# Patient Record
Sex: MALE | Race: ASIAN | NOT HISPANIC OR LATINO
[De-identification: names, ages, dates, MRNs, and addresses within clinical notes are randomized per-mention and may not be internally consistent; named-entity substitution may affect disease eponyms.]

---

## 2017-06-15 ENCOUNTER — APPOINTMENT (OUTPATIENT)
Dept: OPHTHALMOLOGY | Facility: CLINIC | Age: 62
End: 2017-06-15

## 2017-06-15 PROBLEM — Z00.00 ENCOUNTER FOR PREVENTIVE HEALTH EXAMINATION: Status: ACTIVE | Noted: 2017-06-15

## 2019-06-03 ENCOUNTER — OUTPATIENT (OUTPATIENT)
Dept: OUTPATIENT SERVICES | Facility: HOSPITAL | Age: 64
LOS: 1 days | End: 2019-06-03
Payer: MEDICARE

## 2019-06-03 VITALS
OXYGEN SATURATION: 99 % | HEART RATE: 86 BPM | HEIGHT: 68 IN | RESPIRATION RATE: 16 BRPM | WEIGHT: 151.02 LBS | SYSTOLIC BLOOD PRESSURE: 117 MMHG | TEMPERATURE: 98 F | DIASTOLIC BLOOD PRESSURE: 83 MMHG

## 2019-06-03 DIAGNOSIS — Z01.818 ENCOUNTER FOR OTHER PREPROCEDURAL EXAMINATION: ICD-10-CM

## 2019-06-03 DIAGNOSIS — Z98.890 OTHER SPECIFIED POSTPROCEDURAL STATES: Chronic | ICD-10-CM

## 2019-06-03 DIAGNOSIS — I48.91 UNSPECIFIED ATRIAL FIBRILLATION: ICD-10-CM

## 2019-06-03 LAB
ALBUMIN SERPL ELPH-MCNC: 4.2 G/DL — SIGNIFICANT CHANGE UP (ref 3.3–5)
ALP SERPL-CCNC: 57 U/L — SIGNIFICANT CHANGE UP (ref 40–120)
ALT FLD-CCNC: 60 U/L — HIGH (ref 10–45)
ANION GAP SERPL CALC-SCNC: 11 MMOL/L — SIGNIFICANT CHANGE UP (ref 5–17)
APTT BLD: 32.4 SEC — SIGNIFICANT CHANGE UP (ref 27.5–36.3)
AST SERPL-CCNC: 25 U/L — SIGNIFICANT CHANGE UP (ref 10–40)
BILIRUB SERPL-MCNC: 0.4 MG/DL — SIGNIFICANT CHANGE UP (ref 0.2–1.2)
BLD GP AB SCN SERPL QL: NEGATIVE — SIGNIFICANT CHANGE UP
BUN SERPL-MCNC: 19 MG/DL — SIGNIFICANT CHANGE UP (ref 7–23)
CALCIUM SERPL-MCNC: 9.5 MG/DL — SIGNIFICANT CHANGE UP (ref 8.4–10.5)
CHLORIDE SERPL-SCNC: 107 MMOL/L — SIGNIFICANT CHANGE UP (ref 96–108)
CO2 SERPL-SCNC: 26 MMOL/L — SIGNIFICANT CHANGE UP (ref 22–31)
CREAT SERPL-MCNC: 1.08 MG/DL — SIGNIFICANT CHANGE UP (ref 0.5–1.3)
GLUCOSE SERPL-MCNC: 95 MG/DL — SIGNIFICANT CHANGE UP (ref 70–99)
HCT VFR BLD CALC: 41.7 % — SIGNIFICANT CHANGE UP (ref 39–50)
HGB BLD-MCNC: 14.4 G/DL — SIGNIFICANT CHANGE UP (ref 13–17)
INR BLD: 1.1 RATIO — SIGNIFICANT CHANGE UP (ref 0.88–1.16)
MCHC RBC-ENTMCNC: 32.5 PG — SIGNIFICANT CHANGE UP (ref 27–34)
MCHC RBC-ENTMCNC: 34.5 GM/DL — SIGNIFICANT CHANGE UP (ref 32–36)
MCV RBC AUTO: 94.1 FL — SIGNIFICANT CHANGE UP (ref 80–100)
PLATELET # BLD AUTO: 126 K/UL — LOW (ref 150–400)
POTASSIUM SERPL-MCNC: 4.8 MMOL/L — SIGNIFICANT CHANGE UP (ref 3.5–5.3)
POTASSIUM SERPL-SCNC: 4.8 MMOL/L — SIGNIFICANT CHANGE UP (ref 3.5–5.3)
PROT SERPL-MCNC: 6.9 G/DL — SIGNIFICANT CHANGE UP (ref 6–8.3)
PROTHROM AB SERPL-ACNC: 12.6 SEC — SIGNIFICANT CHANGE UP (ref 10–12.9)
RBC # BLD: 4.44 M/UL — SIGNIFICANT CHANGE UP (ref 4.2–5.8)
RBC # FLD: 11.9 % — SIGNIFICANT CHANGE UP (ref 10.3–14.5)
RH IG SCN BLD-IMP: POSITIVE — SIGNIFICANT CHANGE UP
SODIUM SERPL-SCNC: 144 MMOL/L — SIGNIFICANT CHANGE UP (ref 135–145)
WBC # BLD: 3.7 K/UL — LOW (ref 3.8–10.5)
WBC # FLD AUTO: 3.7 K/UL — LOW (ref 3.8–10.5)

## 2019-06-03 PROCEDURE — 93010 ELECTROCARDIOGRAM REPORT: CPT

## 2019-06-03 RX ORDER — METOPROLOL TARTRATE 50 MG
1 TABLET ORAL
Qty: 0 | Refills: 0 | DISCHARGE

## 2019-06-03 RX ORDER — APIXABAN 2.5 MG/1
1 TABLET, FILM COATED ORAL
Qty: 0 | Refills: 0 | DISCHARGE

## 2019-06-03 NOTE — H&P CARDIOLOGY - FAMILY HISTORY
Sibling  Still living? Unknown  Family history of aortic dissection, Age at diagnosis: Age Unknown     Father  Still living? Unknown  Family history of stroke, Age at diagnosis: Age Unknown

## 2019-06-03 NOTE — H&P CARDIOLOGY - HISTORY OF PRESENT ILLNESS
65 y/o  male PMH Afib on Eliquis presents for PST today for Afib ablation on 6/4/19. Pt reports he was instructed not to take his Eliquis tomorrow morning. 65 y/o  male PMH Afib on Eliquis presents for PST today for Afib ablation on 6/4/19. Pt reports he was instructed not to take his Eliquis tomorrow morning by DR. Howard. Reports feeling well.

## 2019-06-04 ENCOUNTER — INPATIENT (INPATIENT)
Facility: HOSPITAL | Age: 64
LOS: 0 days | Discharge: HOME | DRG: 274 | End: 2019-06-05
Attending: STUDENT IN AN ORGANIZED HEALTH CARE EDUCATION/TRAINING PROGRAM | Admitting: INTERNAL MEDICINE
Payer: COMMERCIAL

## 2019-06-04 VITALS
HEIGHT: 68 IN | RESPIRATION RATE: 18 BRPM | SYSTOLIC BLOOD PRESSURE: 106 MMHG | OXYGEN SATURATION: 100 % | HEART RATE: 60 BPM | DIASTOLIC BLOOD PRESSURE: 78 MMHG | WEIGHT: 153.66 LBS

## 2019-06-04 DIAGNOSIS — Z98.890 OTHER SPECIFIED POSTPROCEDURAL STATES: Chronic | ICD-10-CM

## 2019-06-04 DIAGNOSIS — I48.91 UNSPECIFIED ATRIAL FIBRILLATION: ICD-10-CM

## 2019-06-04 LAB
ALBUMIN SERPL ELPH-MCNC: 4.1 G/DL — SIGNIFICANT CHANGE UP (ref 3.3–5)
ALBUMIN SERPL ELPH-MCNC: 4.2 G/DL — SIGNIFICANT CHANGE UP (ref 3.3–5)
ALP SERPL-CCNC: 48 U/L — SIGNIFICANT CHANGE UP (ref 40–120)
ALP SERPL-CCNC: 53 U/L — SIGNIFICANT CHANGE UP (ref 40–120)
ALT FLD-CCNC: 51 U/L — HIGH (ref 10–45)
ALT FLD-CCNC: 53 U/L — HIGH (ref 10–45)
ANION GAP SERPL CALC-SCNC: 13 MMOL/L — SIGNIFICANT CHANGE UP (ref 5–17)
ANION GAP SERPL CALC-SCNC: 9 MMOL/L — SIGNIFICANT CHANGE UP (ref 5–17)
APTT BLD: 102.5 SEC — HIGH (ref 27.5–36.3)
AST SERPL-CCNC: 62 U/L — HIGH (ref 10–40)
AST SERPL-CCNC: 65 U/L — HIGH (ref 10–40)
BASOPHILS # BLD AUTO: 0 K/UL — SIGNIFICANT CHANGE UP (ref 0–0.2)
BASOPHILS NFR BLD AUTO: 0.2 % — SIGNIFICANT CHANGE UP (ref 0–2)
BILIRUB SERPL-MCNC: 0.8 MG/DL — SIGNIFICANT CHANGE UP (ref 0.2–1.2)
BILIRUB SERPL-MCNC: 0.8 MG/DL — SIGNIFICANT CHANGE UP (ref 0.2–1.2)
BLD GP AB SCN SERPL QL: NEGATIVE — SIGNIFICANT CHANGE UP
BUN SERPL-MCNC: 17 MG/DL — SIGNIFICANT CHANGE UP (ref 7–23)
BUN SERPL-MCNC: 18 MG/DL — SIGNIFICANT CHANGE UP (ref 7–23)
CALCIUM SERPL-MCNC: 8.5 MG/DL — SIGNIFICANT CHANGE UP (ref 8.4–10.5)
CALCIUM SERPL-MCNC: 8.6 MG/DL — SIGNIFICANT CHANGE UP (ref 8.4–10.5)
CHLORIDE SERPL-SCNC: 103 MMOL/L — SIGNIFICANT CHANGE UP (ref 96–108)
CHLORIDE SERPL-SCNC: 103 MMOL/L — SIGNIFICANT CHANGE UP (ref 96–108)
CO2 SERPL-SCNC: 22 MMOL/L — SIGNIFICANT CHANGE UP (ref 22–31)
CO2 SERPL-SCNC: 25 MMOL/L — SIGNIFICANT CHANGE UP (ref 22–31)
CREAT SERPL-MCNC: 0.93 MG/DL — SIGNIFICANT CHANGE UP (ref 0.5–1.3)
CREAT SERPL-MCNC: 1.05 MG/DL — SIGNIFICANT CHANGE UP (ref 0.5–1.3)
EOSINOPHIL # BLD AUTO: 0 K/UL — SIGNIFICANT CHANGE UP (ref 0–0.5)
EOSINOPHIL NFR BLD AUTO: 0.6 % — SIGNIFICANT CHANGE UP (ref 0–6)
GLUCOSE SERPL-MCNC: 108 MG/DL — HIGH (ref 70–99)
GLUCOSE SERPL-MCNC: 120 MG/DL — HIGH (ref 70–99)
HCT VFR BLD CALC: 46.4 % — SIGNIFICANT CHANGE UP (ref 39–50)
HGB BLD-MCNC: 15.7 G/DL — SIGNIFICANT CHANGE UP (ref 13–17)
INR BLD: 1.03 RATIO — SIGNIFICANT CHANGE UP (ref 0.88–1.16)
LYMPHOCYTES # BLD AUTO: 0.7 K/UL — LOW (ref 1–3.3)
LYMPHOCYTES # BLD AUTO: 9.9 % — LOW (ref 13–44)
MAGNESIUM SERPL-MCNC: 2 MG/DL — SIGNIFICANT CHANGE UP (ref 1.6–2.6)
MAGNESIUM SERPL-MCNC: 2.1 MG/DL — SIGNIFICANT CHANGE UP (ref 1.6–2.6)
MCHC RBC-ENTMCNC: 31.9 PG — SIGNIFICANT CHANGE UP (ref 27–34)
MCHC RBC-ENTMCNC: 33.8 GM/DL — SIGNIFICANT CHANGE UP (ref 32–36)
MCV RBC AUTO: 94.4 FL — SIGNIFICANT CHANGE UP (ref 80–100)
MONOCYTES # BLD AUTO: 0.4 K/UL — SIGNIFICANT CHANGE UP (ref 0–0.9)
MONOCYTES NFR BLD AUTO: 5.8 % — SIGNIFICANT CHANGE UP (ref 2–14)
NEUTROPHILS # BLD AUTO: 5.9 K/UL — SIGNIFICANT CHANGE UP (ref 1.8–7.4)
NEUTROPHILS NFR BLD AUTO: 83.4 % — HIGH (ref 43–77)
PHOSPHATE SERPL-MCNC: 3 MG/DL — SIGNIFICANT CHANGE UP (ref 2.5–4.5)
PHOSPHATE SERPL-MCNC: 3.4 MG/DL — SIGNIFICANT CHANGE UP (ref 2.5–4.5)
PLATELET # BLD AUTO: 117 K/UL — LOW (ref 150–400)
POTASSIUM SERPL-MCNC: 4.9 MMOL/L — SIGNIFICANT CHANGE UP (ref 3.5–5.3)
POTASSIUM SERPL-MCNC: 6 MMOL/L — HIGH (ref 3.5–5.3)
POTASSIUM SERPL-SCNC: 4.9 MMOL/L — SIGNIFICANT CHANGE UP (ref 3.5–5.3)
POTASSIUM SERPL-SCNC: 6 MMOL/L — HIGH (ref 3.5–5.3)
PROT SERPL-MCNC: 6.6 G/DL — SIGNIFICANT CHANGE UP (ref 6–8.3)
PROT SERPL-MCNC: 7.1 G/DL — SIGNIFICANT CHANGE UP (ref 6–8.3)
PROTHROM AB SERPL-ACNC: 11.8 SEC — SIGNIFICANT CHANGE UP (ref 10–12.9)
RBC # BLD: 4.91 M/UL — SIGNIFICANT CHANGE UP (ref 4.2–5.8)
RBC # FLD: 11.8 % — SIGNIFICANT CHANGE UP (ref 10.3–14.5)
RH IG SCN BLD-IMP: POSITIVE — SIGNIFICANT CHANGE UP
SODIUM SERPL-SCNC: 137 MMOL/L — SIGNIFICANT CHANGE UP (ref 135–145)
SODIUM SERPL-SCNC: 138 MMOL/L — SIGNIFICANT CHANGE UP (ref 135–145)
WBC # BLD: 7.1 K/UL — SIGNIFICANT CHANGE UP (ref 3.8–10.5)
WBC # FLD AUTO: 7.1 K/UL — SIGNIFICANT CHANGE UP (ref 3.8–10.5)

## 2019-06-04 PROCEDURE — 99223 1ST HOSP IP/OBS HIGH 75: CPT | Mod: GC

## 2019-06-04 PROCEDURE — 93010 ELECTROCARDIOGRAM REPORT: CPT

## 2019-06-04 RX ORDER — APIXABAN 2.5 MG/1
5 TABLET, FILM COATED ORAL EVERY 12 HOURS
Refills: 0 | Status: DISCONTINUED | OUTPATIENT
Start: 2019-06-04 | End: 2019-06-05

## 2019-06-04 RX ORDER — CHLORHEXIDINE GLUCONATE 213 G/1000ML
1 SOLUTION TOPICAL AT BEDTIME
Refills: 0 | Status: DISCONTINUED | OUTPATIENT
Start: 2019-06-04 | End: 2019-06-05

## 2019-06-04 RX ORDER — PANTOPRAZOLE SODIUM 20 MG/1
40 TABLET, DELAYED RELEASE ORAL
Refills: 0 | Status: DISCONTINUED | OUTPATIENT
Start: 2019-06-04 | End: 2019-06-05

## 2019-06-04 RX ORDER — CHLORHEXIDINE GLUCONATE 213 G/1000ML
1 SOLUTION TOPICAL
Refills: 0 | Status: DISCONTINUED | OUTPATIENT
Start: 2019-06-04 | End: 2019-06-04

## 2019-06-04 RX ORDER — APIXABAN 2.5 MG/1
2.5 TABLET, FILM COATED ORAL EVERY 12 HOURS
Refills: 0 | Status: DISCONTINUED | OUTPATIENT
Start: 2019-06-04 | End: 2019-06-04

## 2019-06-04 RX ORDER — METOPROLOL TARTRATE 50 MG
50 TABLET ORAL DAILY
Refills: 0 | Status: DISCONTINUED | OUTPATIENT
Start: 2019-06-04 | End: 2019-06-05

## 2019-06-04 RX ADMIN — Medication 50 MILLIGRAM(S): at 18:21

## 2019-06-04 RX ADMIN — APIXABAN 5 MILLIGRAM(S): 2.5 TABLET, FILM COATED ORAL at 18:21

## 2019-06-04 RX ADMIN — CHLORHEXIDINE GLUCONATE 1 APPLICATION(S): 213 SOLUTION TOPICAL at 21:08

## 2019-06-04 NOTE — PROGRESS NOTE ADULT - SUBJECTIVE AND OBJECTIVE BOX
====================  CCU MIDNIGHT ROUNDS  ====================    CLARISSE BROWN  [id number]  Patient is a 64y old  Male who presents with a chief complaint of afib ablation    ====================  SUMMARY: 63 y/o  M with PMH Afib on Eliquis presented today for scheduled Afib ablation with Dr. Howard.    ====================      ====================  NEW EVENTS: No acute issues overnight  ====================    MEDICATIONS  (STANDING):  apixaban 5 milliGRAM(s) Oral every 12 hours  chlorhexidine 2% Cloths 1 Application(s) Topical at bedtime  metoprolol succinate ER 50 milliGRAM(s) Oral daily  pantoprazole    Tablet 40 milliGRAM(s) Oral before breakfast    MEDICATIONS  (PRN):      ====================  VITALS (Last 12 hrs):  ====================    T(C): 36.6 (06-04-19 @ 20:00), Max: 36.6 (06-04-19 @ 20:00)  T(F): 97.9 (06-04-19 @ 20:00), Max: 97.9 (06-04-19 @ 20:00)  HR: 79 (06-04-19 @ 22:00) (58 - 86)  BP: 109/67 (06-04-19 @ 22:00) (98/70 - 120/78)  BP(mean): 84 (06-04-19 @ 22:00) (75 - 97)  RR: 13 (06-04-19 @ 22:00) (11 - 18)  SpO2: 97% (06-04-19 @ 22:00) (95% - 100%)      I&O's Summary    04 Jun 2019 07:01  -  04 Jun 2019 22:26  --------------------------------------------------------  IN: 120 mL / OUT: 150 mL / NET: -30 mL    ====================  NEW LABS:  ====================      06-04    138  |  103  |  17  ----------------------------<  120<H>  4.9   |  22  |  0.93    Ca    8.5      04 Jun 2019 16:22  Phos  3.4     06-04  Mg     2.0     06-04    TPro  6.6  /  Alb  4.1  /  TBili  0.8  /  DBili  x   /  AST  65<H>  /  ALT  51<H>  /  AlkPhos  48  06-04    PT/INR - ( 04 Jun 2019 14:43 )   PT: 11.8 sec;   INR: 1.03 ratio         PTT - ( 04 Jun 2019 14:43 )  PTT:102.5 sec      ====================  PLAN:  ====================  #Cardiac  Afib s/p ablation  -started Eliquis 5mg q12hrs tonight   -monitor groin site  -started mech soft diet and PPI  -restarted metoprolol ER 50mg daily  -encourage OOB tonight  -Planned for D/C in the AM  -EKG in the AM      Kristine Sandoval [facility] NP  Beeper #[id number]  Spectra # [phone]

## 2019-06-04 NOTE — CHART NOTE - NSCHARTNOTEFT_GEN_A_CORE
Chief Complaint:    Subjective:     Objective:  Vital Signs Last 24 Hrs  T(C): 36.4 (04 Jun 2019 14:00), Max: 36.4 (04 Jun 2019 14:00)  T(F): 97.5 (04 Jun 2019 14:00), Max: 97.5 (04 Jun 2019 14:00)  HR: 62 (04 Jun 2019 14:00) (58 - 62)  BP: 110/81 (04 Jun 2019 14:00) (106/78 - 118/84)  BP(mean): 91 (04 Jun 2019 14:00) (89 - 97)  RR: 11 (04 Jun 2019 14:00) (11 - 18)  SpO2: 100% (04 Jun 2019 14:00) (100% - 100%)  ICU Vital Signs Last 24 Hrs  T(C): 36.4 (04 Jun 2019 14:00), Max: 36.4 (04 Jun 2019 14:00)  T(F): 97.5 (04 Jun 2019 14:00), Max: 97.5 (04 Jun 2019 14:00)  HR: 62 (04 Jun 2019 14:00) (58 - 62)  BP: 110/81 (04 Jun 2019 14:00) (106/78 - 118/84)  BP(mean): 91 (04 Jun 2019 14:00) (89 - 97)  ABP: --  ABP(mean): --  RR: 11 (04 Jun 2019 14:00) (11 - 18)  SpO2: 100% (04 Jun 2019 14:00) (100% - 100%)    I&O's Summary    Daily     Daily     PHYSICAL EXAM:   General:   HEENT:  Cardiovascular:   Respiratory:   Gastrointestinal:  Genitourinary:   Integumentary:   Musculoskeletal:   Hematologic/Lymphatic:   Endocrine:   Neurologic:   Psychologic:     TELEMETRY:     EKG:     CARDIAC CATH:     ECHO:                          14.4   3.7   )-----------( 126      ( 03 Jun 2019 12:56 )             41.7     06-03    144  |  107  |  19  ----------------------------<  95  4.8   |  26  |  1.08    Ca    9.5      03 Jun 2019 12:56    TPro  6.9  /  Alb  4.2  /  TBili  0.4  /  DBili  x   /  AST  25  /  ALT  60<H>  /  AlkPhos  57  06-03    LIVER FUNCTIONS - ( 03 Jun 2019 12:56 )  Alb: 4.2 g/dL / Pro: 6.9 g/dL / ALK PHOS: 57 U/L / ALT: 60 U/L / AST: 25 U/L / GGT: x             PT/INR - ( 03 Jun 2019 12:56 )   PT: 12.6 sec;   INR: 1.10 ratio         PTT - ( 03 Jun 2019 12:56 )  PTT:32.4 sec      Assessment/Plan:  HPI:  63 y/o  male PMH Afib on Eliquis presents for PST today for Afib ablation on 6/4/19. Pt reports he was instructed not to take his Eliquis tomorrow morning by DR. Howard. Reports feeling well. (03 Jun 2019 12:06)    HEALTH ISSUES - PROBLEM Dx:        HEALTH ISSUES - R/O PROBLEM Dx:      Tiki Lai CCU NP   # 63 y/o  male PMH Afib on Eliquis presents for PST today for Afib ablation on 6/4/19. Pt reports he was instructed not to take his Eliquis tomorrow morning by DR. Howard. Reports feeling well.     Subjective: No complaints of pain.     Objective:  Vital Signs Last 24 Hrs  T(C): 36.4 (04 Jun 2019 14:00), Max: 36.4 (04 Jun 2019 14:00)  T(F): 97.5 (04 Jun 2019 14:00), Max: 97.5 (04 Jun 2019 14:00)  HR: 62 (04 Jun 2019 14:00) (58 - 62)  BP: 110/81 (04 Jun 2019 14:00) (106/78 - 118/84)  BP(mean): 91 (04 Jun 2019 14:00) (89 - 97)  RR: 11 (04 Jun 2019 14:00) (11 - 18)  SpO2: 100% (04 Jun 2019 14:00) (100% - 100%)  ICU Vital Signs Last 24 Hrs  T(C): 36.4 (04 Jun 2019 14:00), Max: 36.4 (04 Jun 2019 14:00)  T(F): 97.5 (04 Jun 2019 14:00), Max: 97.5 (04 Jun 2019 14:00)  HR: 62 (04 Jun 2019 14:00) (58 - 62)  BP: 110/81 (04 Jun 2019 14:00) (106/78 - 118/84)  BP(mean): 91 (04 Jun 2019 14:00) (89 - 97)  RR: 11 (04 Jun 2019 14:00) (11 - 18)  SpO2: 100% (04 Jun 2019 14:00) (100% - 100%)    PHYSICAL EXAM:   General: WAM in NAD, lethargic from anesthesia  HEENT: atraumatic   Cardiovascular: S1, S2, . No edema.   Respiratory: CTA B/L   Gastrointestinal: abd soft, NT, ND  Integumentary:   Musculoskeletal:   Hematologic/Lymphatic:   Endocrine:   Neurologic:   Psychologic:     TELEMETRY:     EKG:     CARDIAC CATH:     ECHO:                          14.4   3.7   )-----------( 126      ( 03 Jun 2019 12:56 )             41.7     06-03    144  |  107  |  19  ----------------------------<  95  4.8   |  26  |  1.08    Ca    9.5      03 Jun 2019 12:56    TPro  6.9  /  Alb  4.2  /  TBili  0.4  /  DBili  x   /  AST  25  /  ALT  60<H>  /  AlkPhos  57  06-03    LIVER FUNCTIONS - ( 03 Jun 2019 12:56 )  Alb: 4.2 g/dL / Pro: 6.9 g/dL / ALK PHOS: 57 U/L / ALT: 60 U/L / AST: 25 U/L / GGT: x             PT/INR - ( 03 Jun 2019 12:56 )   PT: 12.6 sec;   INR: 1.10 ratio         PTT - ( 03 Jun 2019 12:56 )  PTT:32.4 sec      Assessment/Plan:  HPI:  63 y/o  male PMH Afib on Eliquis presents for PST today for Afib ablation on 6/4/19. Pt reports he was instructed not to take his Eliquis tomorrow morning by DR. Howard. Reports feeling well. (03 Jun 2019 12:06)    HEALTH ISSUES - PROBLEM Dx:        HEALTH ISSUES - R/O PROBLEM Dx:      Tiki Lai CCU NP   # 65 y/o  male PMH Afib on Eliquis presents for PST today for Afib ablation on 19. Pt reports he was instructed not to take his Eliquis tomorrow morning by DR. Howard. Reports feeling well.     Subjective: No complaints of pain.     Objective:  Vital Signs Last 24 Hrs  T(C): 36.4 (2019 14:00), Max: 36.4 (2019 14:00)  T(F): 97.5 (2019 14:00), Max: 97.5 (2019 14:00)  HR: 62 (2019 14:00) (58 - 62)  BP: 110/81 (2019 14:00) (106/78 - 118/84)  BP(mean): 91 (2019 14:00) (89 - 97)  RR: 11 (2019 14:00) (11 - 18)  SpO2: 100% (:00) (100% - 100%)  ICU Vital Signs Last 24 Hrs  T(C): 36.4 (2019 14:00), Max: 36.4 (2019 14:00)  T(F): 97.5 (2019 14:00), Max: 97.5 (2019 14:00)  HR: 62 (2019 14:00) (58 - 62)  BP: 110/81 (2019 14:00) (106/78 - 118/84)  BP(mean): 91 (2019 14:00) (89 - 97)  RR: 11 (:00) (11 - 18)  SpO2: 100% (2019 14:00) (100% - 100%)    PHYSICAL EXAM:   General: WAM in NAD, lethargic from anesthesia  HEENT: atraumatic   Cardiovascular: S1, S2, . No edema.   Respiratory: CTA B/L   Gastrointestinal: abd soft, NT, ND  Integumentary: skin dry and intact  Musculoskeletal: MEZA w/ equal strength  Neurologic: A and O x4  Psychologic: normal affect    TELEMETRY: NSR 75    EK.4   3.7   )-----------( 126      ( 2019 12:56 )             41.7     06-03    144  |  107  |  19  ----------------------------<  95  4.8   |  26  |  1.08    Ca    9.5      2019 12:56    TPro  6.9  /  Alb  4.2  /  TBili  0.4  /  DBili  x   /  AST  25  /  ALT  60<H>  /  AlkPhos  57  06-03    LIVER FUNCTIONS - ( 2019 12:56 )  Alb: 4.2 g/dL / Pro: 6.9 g/dL / ALK PHOS: 57 U/L / ALT: 60 U/L / AST: 25 U/L / GGT: x             PT/INR - ( 2019 12:56 )   PT: 12.6 sec;   INR: 1.10 ratio         PTT - ( 2019 12:56 )  PTT:32.4 sec      Assessment/Plan:  HPI:  65 y/o  male PMH Afib on Eliquis presents for PST today for Afib ablation on 19. Pt reports he was instructed not to take his Eliquis tomorrow morning by DR. Howard. Reports feeling well. (2019 12:06)    HEALTH ISSUES - PROBLEM Dx:        HEALTH ISSUES - R/O PROBLEM Dx:      Tiki Lai CCU NP   # 65 y/o  male PMH Afib on Eliquis presents for PST today for Afib ablation on 6/4/19. Pt reports he was instructed not to take his Eliquis tomorrow morning by DR. Howard. Reports feeling well.     Subjective: No complaints of pain.     Objective:  Vital Signs Last 24 Hrs  T(C): 36.4 (04 Jun 2019 14:00), Max: 36.4 (04 Jun 2019 14:00)  T(F): 97.5 (04 Jun 2019 14:00), Max: 97.5 (04 Jun 2019 14:00)  HR: 62 (04 Jun 2019 14:00) (58 - 62)  BP: 110/81 (04 Jun 2019 14:00) (106/78 - 118/84)  BP(mean): 91 (04 Jun 2019 14:00) (89 - 97)  RR: 11 (04 Jun 2019 14:00) (11 - 18)  SpO2: 100% (04 Jun 2019 14:00) (100% - 100%)  ICU Vital Signs Last 24 Hrs  T(C): 36.4 (04 Jun 2019 14:00), Max: 36.4 (04 Jun 2019 14:00)  T(F): 97.5 (04 Jun 2019 14:00), Max: 97.5 (04 Jun 2019 14:00)  HR: 62 (04 Jun 2019 14:00) (58 - 62)  BP: 110/81 (04 Jun 2019 14:00) (106/78 - 118/84)  BP(mean): 91 (04 Jun 2019 14:00) (89 - 97)  RR: 11 (04 Jun 2019 14:00) (11 - 18)  SpO2: 100% (04 Jun 2019 14:00) (100% - 100%)    PHYSICAL EXAM:   General: WAM in NAD, lethargic from anesthesia  HEENT: atraumatic   Cardiovascular: S1, S2, . No edema.   Respiratory: CTA B/L   Gastrointestinal: abd soft, NT, ND  Integumentary: skin dry and intact  Musculoskeletal: MEZA w/ equal strength  Neurologic: A and O x4  Psychologic: normal affect    TELEMETRY: NSR 75    EKG: Sinus Gordon                          14.4   3.7   )-----------( 126      ( 03 Jun 2019 12:56 )             41.7     06-03    144  |  107  |  19  ----------------------------<  95  4.8   |  26  |  1.08    Ca    9.5      03 Jun 2019 12:56    TPro  6.9  /  Alb  4.2  /  TBili  0.4  /  DBili  x   /  AST  25  /  ALT  60<H>  /  AlkPhos  57  06-03    LIVER FUNCTIONS - ( 03 Jun 2019 12:56 )  Alb: 4.2 g/dL / Pro: 6.9 g/dL / ALK PHOS: 57 U/L / ALT: 60 U/L / AST: 25 U/L / GGT: x             PT/INR - ( 03 Jun 2019 12:56 )   PT: 12.6 sec;   INR: 1.10 ratio         PTT - ( 03 Jun 2019 12:56 )  PTT:32.4 sec      Assessment/Plan:  65 y/o  male PMH Afib on Eliquis s/p AF ablation  - AF ablation  - Continue PPI and mech soft diet  - Continue home Toprol and Eliquis  - OOB  - Anticipated discharge tomorrow       Tiki Lai CCU NP   #[id number]

## 2019-06-05 ENCOUNTER — TRANSCRIPTION ENCOUNTER (OUTPATIENT)
Age: 64
End: 2019-06-05

## 2019-06-05 VITALS — DIASTOLIC BLOOD PRESSURE: 70 MMHG | SYSTOLIC BLOOD PRESSURE: 111 MMHG | HEART RATE: 75 BPM | RESPIRATION RATE: 16 BRPM

## 2019-06-05 DIAGNOSIS — I48.91 UNSPECIFIED ATRIAL FIBRILLATION: ICD-10-CM

## 2019-06-05 LAB
ALBUMIN SERPL ELPH-MCNC: 3.7 G/DL — SIGNIFICANT CHANGE UP (ref 3.3–5)
ALP SERPL-CCNC: 45 U/L — SIGNIFICANT CHANGE UP (ref 40–120)
ALT FLD-CCNC: 43 U/L — SIGNIFICANT CHANGE UP (ref 10–45)
ANION GAP SERPL CALC-SCNC: 13 MMOL/L — SIGNIFICANT CHANGE UP (ref 5–17)
APTT BLD: 28.4 SEC — SIGNIFICANT CHANGE UP (ref 27.5–36.3)
AST SERPL-CCNC: 54 U/L — HIGH (ref 10–40)
BASOPHILS # BLD AUTO: 0 K/UL — SIGNIFICANT CHANGE UP (ref 0–0.2)
BASOPHILS NFR BLD AUTO: 0.6 % — SIGNIFICANT CHANGE UP (ref 0–2)
BILIRUB SERPL-MCNC: 0.6 MG/DL — SIGNIFICANT CHANGE UP (ref 0.2–1.2)
BUN SERPL-MCNC: 15 MG/DL — SIGNIFICANT CHANGE UP (ref 7–23)
CALCIUM SERPL-MCNC: 8.6 MG/DL — SIGNIFICANT CHANGE UP (ref 8.4–10.5)
CHLORIDE SERPL-SCNC: 102 MMOL/L — SIGNIFICANT CHANGE UP (ref 96–108)
CO2 SERPL-SCNC: 25 MMOL/L — SIGNIFICANT CHANGE UP (ref 22–31)
CREAT SERPL-MCNC: 1.05 MG/DL — SIGNIFICANT CHANGE UP (ref 0.5–1.3)
EOSINOPHIL # BLD AUTO: 0.1 K/UL — SIGNIFICANT CHANGE UP (ref 0–0.5)
EOSINOPHIL NFR BLD AUTO: 1.1 % — SIGNIFICANT CHANGE UP (ref 0–6)
GLUCOSE SERPL-MCNC: 99 MG/DL — SIGNIFICANT CHANGE UP (ref 70–99)
HCT VFR BLD CALC: 40.4 % — SIGNIFICANT CHANGE UP (ref 39–50)
HGB BLD-MCNC: 13.8 G/DL — SIGNIFICANT CHANGE UP (ref 13–17)
INR BLD: 1.08 RATIO — SIGNIFICANT CHANGE UP (ref 0.88–1.16)
LYMPHOCYTES # BLD AUTO: 0.9 K/UL — LOW (ref 1–3.3)
LYMPHOCYTES # BLD AUTO: 15.7 % — SIGNIFICANT CHANGE UP (ref 13–44)
MAGNESIUM SERPL-MCNC: 2 MG/DL — SIGNIFICANT CHANGE UP (ref 1.6–2.6)
MCHC RBC-ENTMCNC: 32.2 PG — SIGNIFICANT CHANGE UP (ref 27–34)
MCHC RBC-ENTMCNC: 34 GM/DL — SIGNIFICANT CHANGE UP (ref 32–36)
MCV RBC AUTO: 94.8 FL — SIGNIFICANT CHANGE UP (ref 80–100)
MONOCYTES # BLD AUTO: 0.5 K/UL — SIGNIFICANT CHANGE UP (ref 0–0.9)
MONOCYTES NFR BLD AUTO: 8.1 % — SIGNIFICANT CHANGE UP (ref 2–14)
NEUTROPHILS # BLD AUTO: 4.4 K/UL — SIGNIFICANT CHANGE UP (ref 1.8–7.4)
NEUTROPHILS NFR BLD AUTO: 74.5 % — SIGNIFICANT CHANGE UP (ref 43–77)
PHOSPHATE SERPL-MCNC: 4 MG/DL — SIGNIFICANT CHANGE UP (ref 2.5–4.5)
PLATELET # BLD AUTO: 104 K/UL — LOW (ref 150–400)
POTASSIUM SERPL-MCNC: 4.3 MMOL/L — SIGNIFICANT CHANGE UP (ref 3.5–5.3)
POTASSIUM SERPL-SCNC: 4.3 MMOL/L — SIGNIFICANT CHANGE UP (ref 3.5–5.3)
PROT SERPL-MCNC: 6 G/DL — SIGNIFICANT CHANGE UP (ref 6–8.3)
PROTHROM AB SERPL-ACNC: 12.5 SEC — SIGNIFICANT CHANGE UP (ref 10–12.9)
RBC # BLD: 4.27 M/UL — SIGNIFICANT CHANGE UP (ref 4.2–5.8)
RBC # FLD: 12 % — SIGNIFICANT CHANGE UP (ref 10.3–14.5)
SODIUM SERPL-SCNC: 140 MMOL/L — SIGNIFICANT CHANGE UP (ref 135–145)
WBC # BLD: 5.9 K/UL — SIGNIFICANT CHANGE UP (ref 3.8–10.5)
WBC # FLD AUTO: 5.9 K/UL — SIGNIFICANT CHANGE UP (ref 3.8–10.5)

## 2019-06-05 PROCEDURE — 86850 RBC ANTIBODY SCREEN: CPT

## 2019-06-05 PROCEDURE — C1759: CPT

## 2019-06-05 PROCEDURE — 85027 COMPLETE CBC AUTOMATED: CPT

## 2019-06-05 PROCEDURE — C1893: CPT

## 2019-06-05 PROCEDURE — C1764: CPT

## 2019-06-05 PROCEDURE — 85730 THROMBOPLASTIN TIME PARTIAL: CPT

## 2019-06-05 PROCEDURE — C1733: CPT

## 2019-06-05 PROCEDURE — 93656 COMPRE EP EVAL ABLTJ ATR FIB: CPT

## 2019-06-05 PROCEDURE — 86901 BLOOD TYPING SEROLOGIC RH(D): CPT

## 2019-06-05 PROCEDURE — C1894: CPT

## 2019-06-05 PROCEDURE — 93622 COMP EP EVAL L VENTR PAC&REC: CPT

## 2019-06-05 PROCEDURE — 84100 ASSAY OF PHOSPHORUS: CPT

## 2019-06-05 PROCEDURE — 80053 COMPREHEN METABOLIC PANEL: CPT

## 2019-06-05 PROCEDURE — 99238 HOSP IP/OBS DSCHRG MGMT 30/<: CPT

## 2019-06-05 PROCEDURE — C1766: CPT

## 2019-06-05 PROCEDURE — C1730: CPT

## 2019-06-05 PROCEDURE — 83735 ASSAY OF MAGNESIUM: CPT

## 2019-06-05 PROCEDURE — 93623 PRGRMD STIMJ&PACG IV RX NFS: CPT

## 2019-06-05 PROCEDURE — 33285 INSJ SUBQ CAR RHYTHM MNTR: CPT

## 2019-06-05 PROCEDURE — 93355 ECHO TRANSESOPHAGEAL (TEE): CPT

## 2019-06-05 PROCEDURE — 85610 PROTHROMBIN TIME: CPT

## 2019-06-05 PROCEDURE — C1732: CPT

## 2019-06-05 PROCEDURE — 93005 ELECTROCARDIOGRAM TRACING: CPT

## 2019-06-05 PROCEDURE — G0463: CPT

## 2019-06-05 PROCEDURE — 93010 ELECTROCARDIOGRAM REPORT: CPT

## 2019-06-05 PROCEDURE — 86900 BLOOD TYPING SEROLOGIC ABO: CPT

## 2019-06-05 RX ORDER — PANTOPRAZOLE SODIUM 20 MG/1
1 TABLET, DELAYED RELEASE ORAL
Qty: 30 | Refills: 0
Start: 2019-06-05

## 2019-06-05 RX ADMIN — APIXABAN 5 MILLIGRAM(S): 2.5 TABLET, FILM COATED ORAL at 05:58

## 2019-06-05 RX ADMIN — Medication 50 MILLIGRAM(S): at 10:21

## 2019-06-05 RX ADMIN — PANTOPRAZOLE SODIUM 40 MILLIGRAM(S): 20 TABLET, DELAYED RELEASE ORAL at 05:57

## 2019-06-05 NOTE — DISCHARGE NOTE PROVIDER - HOSPITAL COURSE
63 y/o  M with PMH of Afib on Eliquis presented for scheduled Afib ablation with loop implant on 6/4 with Dr. Howard. Right groin without stitches and appears soft. Loop recorder insertion site to right chest without swelling/redness. 63 y/o  M with PMH of Afib on Eliquis presented for scheduled Afib ablation with loop implant on 6/4 with Dr. Howard. Right groin without stitches and appears soft. Loop recorder insertion site to right chest without swelling/redness. Pt discharged home with follow up with Dr. Howard.

## 2019-06-05 NOTE — DISCHARGE NOTE PROVIDER - NSDCCPTREATMENT_GEN_ALL_CORE_FT
PRINCIPAL PROCEDURE  Procedure: Atrial ablation  Findings and Treatment: It is important to continue your normal daily activities and to continue to walk as much as possible; with periods of rest when necessary.  Do not perform any strenuous activity or heavy lifting until cleared by Dr. Howard. Your heart muscle is currently recovering and you should not be exerting it.  In addition there is no bathing in a bathtub, swimming, or submerging you in water until cleared by Dr. Howard. You have incisions that need to heal and bathing/swimming can expose it to bacteria.  No creams to your incisions. You may remove your dressings when you get home. You may shower. Allow soap and water to run over your incision and pat area dry. Ensure that your groin incisions remain dry at all times to prevent risk of infection.  Follow up with Dr. Howard in 2-3 weeks. Make an appointment within two weeks. Follow up with your primary cardiologist as well.   You are starting Eliquis. Eliquis is a blood thinner and therefore you are at higher risk of bleeding. If you experience any signs of atrial fibrillation such as dizziness, fatigue, palpitations, or fainting please inform Dr. Howard as soon as possible or go to your nearest emergency room.  If you experience any signs of bleeding such as bleeding gums, bloody sputum, bleeding in your stool or black stool inform your primary care doctor.      SECONDARY PROCEDURE  Procedure: Insertion, implantable loop recorder  Findings and Treatment: Cardiac loop recorder is a device used to diagnose heart rhythm problems. The device records a pattern of your heart's rhythm called an EKG. You may also receive a handheld controller. You press a button on the controller when you have symptoms, such as dizziness or palpitations.  The recording can help your healthcare provider see if your symptoms may be caused by your heart rhythm problems. Your healthcare provider will remove the device after it has collected enough data. You may need the device for up to 3 years. The removal is similar to the implant procedure.   You will need to follow up with your Cardiologist within 1-2 weeks to check on your incision.  He will retrieve data from the device every 1-3 months with a monitor held over your skin.    Carefully wash your incision with soap and water. Keep the area clean and dry until it heals.

## 2019-06-05 NOTE — DISCHARGE NOTE NURSING/CASE MANAGEMENT/SOCIAL WORK - NSDCDPATPORTLINK_GEN_ALL_CORE
You can access the [facility] Patient Portal, offered by [facility], by registering with the following website: [email or web address]

## 2019-06-05 NOTE — DISCHARGE NOTE PROVIDER - NSDCCPCAREPLAN_GEN_ALL_CORE_FT
PRINCIPAL DISCHARGE DIAGNOSIS  Diagnosis: Afib  Assessment and Plan of Treatment: Atrial fibrillation is the most common heart rhythm problem.  The condition puts you at risk for has stroke and heart attack. It helps if you control your blood pressure, not drink more than 1-2 alcohol drinks per day, cut down on caffeine, getting treatment for over active thyroid gland, and get regular exercise.  Call your doctor if you feel your heart racing or beating unusually, chest tightness or pain, lightheaded, faint, shortness of breath especially with exercise. It is important to take your heart medication as prescribed. You may be on anticoagulation which is very important to take as directed - you may need blood work to monitor drug levels.   Continue with your cardiologist and primary care MD. Continue your current medications. Call your physician for palpitations, feelings of rapid heart beat, lightheadedness, or dizziness. If you are on warfarin (Coumadin), have your blood work drawn as instructed. PRINCIPAL DISCHARGE DIAGNOSIS  Diagnosis: Afib  Assessment and Plan of Treatment: Atrial fibrillation is the most common heart rhythm problem.  The condition puts you at risk for has stroke and heart attack. It helps if you control your blood pressure, not drink more than 1-2 alcohol drinks per day, cut down on caffeine, getting treatment for over active thyroid gland, and get regular exercise.  Call your doctor if you feel your heart racing or beating unusually, chest tightness or pain, lightheaded, faint, shortness of breath especially with exercise. It is important to take your heart medication as prescribed. You may be on anticoagulation which is very important to take as directed - you may need blood work to monitor drug levels.   Continue with your cardiologist and primary care MD. Continue your current medications. Call your physician for palpitations, feelings of rapid heart beat, lightheadedness, or dizziness. If you are on warfarin (Coumadin), have your blood work drawn as instructed.      SECONDARY DISCHARGE DIAGNOSES  Diagnosis: Thrombocytopenia  Assessment and Plan of Treatment: - You were found to have low platelet counts while you were in the hospital. You platelet count was 107. Please inform your primary care doctor regarding your platelet count. You may need to have further testing done.

## 2019-06-05 NOTE — DISCHARGE NOTE PROVIDER - NSDCFUADDAPPT_GEN_ALL_CORE_FT
Follow up with Dr. Howard within two weeks. Call to schedule an appointment. Follow up with Dr. Howard within two weeks. Call to schedule an appointment.  Follow up with you PCP regarding your platelet count within two weeks.

## 2019-06-05 NOTE — PROGRESS NOTE ADULT - PROBLEM SELECTOR PLAN 1
- s/p cryoablation  - Continue Toprol and Eliquis  - Continue PPI  - Stable for dc home w/ follow up with Dr. Howard

## 2019-06-05 NOTE — PROGRESS NOTE ADULT - SUBJECTIVE AND OBJECTIVE BOX
Events:    Review Of Systems:  Constitutional: denies fever, chills, Fatigue   HEENT: denies Blurred vision, Eye Pain, Headache   Respiratory: denies Cough, Wheezing , Shortness of breath  Cardiovascular: denies Chest Pain, Palpitations,  RANKIN   Gastrointestinal: denies Abdominal Pain, Diarrhea, Constipation   Genitourinary: denies Nocturia, Dysuria, Incontinence  Extremities: denies Swelling, Joint Pain  Neurologic: denies Focal deficit, Paresthesias, Syncope  Lymphatic: denies Swelling, Lymphadenopathy   Skin: denies Rash, Ecchymoses, Wounds   Psychiatry: denies Depression, Suicidal/Homicidal Ideation, anxiety  [X ] 10 point review of systems is otherwise negative except as mentioned above         Medications:  apixaban 5 milliGRAM(s) Oral every 12 hours  chlorhexidine 2% Cloths 1 Application(s) Topical at bedtime  metoprolol succinate ER 50 milliGRAM(s) Oral daily  pantoprazole    Tablet 40 milliGRAM(s) Oral before breakfast    PMH/PSH/FH/SH: [ ] Unchanged  Vitals:  T(C): 37.2 (19 @ 03:00), Max: 37.2 (19 @ 03:00)  HR: 72 (19 @ 07:00) (58 - 86)  BP: 113/73 (19 @ 07:00) (98/70 - 127/72)  BP(mean): 88 (19 @ 07:00) (75 - 97)  RR: 13 (19 @ 07:00) (10 - 21)  SpO2: 97% (19 @ 06:00) (95% - 100%)  Wt(kg): --  Daily Height in cm: 172.72 (2019 12:15)    Daily Weight in k.3 (2019 01:00)  I&O's Summary    2019 07:01  -  2019 07:00  --------------------------------------------------------  IN: 240 mL / OUT: 150 mL / NET: 90 mL        Physical Exam:  Appearance: [ ] Normal [ ] NAD  Eyes: [ ] PERRL [ ] EOMI  HENT: [ ] Normal oral muscosa [ ]NC/AT  Cardiovascular: [ ] S1 [ ] S2 [ ] RRR [ ] No m/r/g [ ]No edema [ ] JVP  Procedural Access Site: [ ] No hematoma [ ] Non-tender to palpation [ ] 2+ pulse [ ] No bruit [ ] No Ecchymosis  Respiratory: [ ] Clear to auscultation bilaterally  Gastrointestinal: [ ] Soft [ ] Non-tender [ ] Non-distended [ ] BS+  Musculoskeletal: [ ] No clubbing [ ] No joint deformity   Neurologic: [ ] Non-focal  Lymphatic: [ ] No lymphadenopathy  Psychiatry: [ ] AAOx3 [ ] Mood & affect appropriate  Skin: [ ] No rashes [ ] No ecchymoses [ ] No cyanosis    06-05    140  |  102  |  15  ----------------------------<  99  4.3   |  25  |  1.05    Ca    8.6      2019 02:30  Phos  4.0     06-05  Mg     2.0     06-05    TPro  6.0  /  Alb  3.7  /  TBili  0.6  /  DBili  x   /  AST  54<H>  /  ALT  43  /  AlkPhos  45  06-05    PT/INR - ( 2019 02:30 )   PT: 12.5 sec;   INR: 1.08 ratio         PTT - ( 2019 02:30 )  PTT:28.4 sec              ECG:    Echo:    Stress Testing:     Cath:    Imaging:    Interpretation of Telemetry: HPI:  65 y/o  male PMH Afib on Eliquis presents for PST today for Afib ablation on 19 w/ loop recorder placement. (2019 12:06)    Events: No acute events. Pt did well overnight and remained in NSR.     Review Of Systems:  Constitutional: denies fever, chills, Fatigue   HEENT: denies Blurred vision, Eye Pain, Headache   Respiratory: denies Cough, Wheezing , Shortness of breath  Cardiovascular: denies Chest Pain, Palpitations,  RANKIN   Gastrointestinal: denies Abdominal Pain, Diarrhea, Constipation   Genitourinary: denies Nocturia, Dysuria, Incontinence  Extremities: denies Swelling, Joint Pain  Neurologic: denies Focal deficit, Paresthesias, Syncope  Lymphatic: denies Swelling, Lymphadenopathy   Skin: denies Rash, Ecchymoses, Wounds   Psychiatry: denies Depression, Suicidal/Homicidal Ideation, anxiety  [X ] 10 point review of systems is otherwise negative except as mentioned above         Medications:  apixaban 5 milliGRAM(s) Oral every 12 hours  chlorhexidine 2% Cloths 1 Application(s) Topical at bedtime  metoprolol succinate ER 50 milliGRAM(s) Oral daily  pantoprazole    Tablet 40 milliGRAM(s) Oral before breakfast    Vitals:  T(C): 37.2 (19 @ 03:00), Max: 37.2 (19 @ 03:00)  HR: 72 (19 @ 07:00) (58 - 86)  BP: 113/73 (19 @ 07:00) (98/70 - 127/72)  BP(mean): 88 (19 @ 07:00) (75 - 97)  RR: 13 (19 @ 07:00) (10 - 21)  SpO2: 97% (19 @ 06:00) (95% - 100%)    Daily Height in cm: 172.72 (2019 12:15)    Daily Weight in k.3 (2019 01:00)  I&O's Summary    2019 07:01  -  2019 07:00  --------------------------------------------------------  IN: 240 mL / OUT: 150 mL / NET: 90 mL    Physical Exam:  Appearance: [ x] Normal [ x] NAD  Eyes: [ x] PERRL [x ] EOMI  HENT: [x ] Normal oral muscosa [x ]NC/AT  Cardiovascular: [ x] S1 [x ] S2 [ x] RRR   Procedural Access Site: L ant chest wall access site C/D/I w/ gauze/[email or web address]. B/L groin access sites C/D/I without bleeding, induration, or hematoma.  Respiratory: [ x] Clear to auscultation bilaterally  Gastrointestinal: [ x] Soft [ x] Non-tender [x ] Non-distended  Musculoskeletal: [ x] No clubbing [ x] No joint deformity   Neurologic: [x ] Non-focal  Lymphatic: [x ] No lymphadenopathy  Psychiatry: [x ] AAOx3 [x ] Mood & affect appropriate  Skin: [x ] No rashes [x ] No ecchymoses [ x] No cyanosis    06-05    140  |  102  |  15  ----------------------------<  99  4.3   |  25  |  1.05    Ca    8.6      2019 02:30  Phos  4.0     06-05  Mg     2.0     06-05    TPro  6.0  /  Alb  3.7  /  TBili  0.6  /  DBili  x   /  AST  54<H>  /  ALT  43  /  AlkPhos  45  06-05    PT/INR - ( 2019 02:30 )   PT: 12.5 sec;   INR: 1.08 ratio       PTT - ( 2019 02:30 )  PTT:28.4 sec    ECG: Sinus Gordon 59    Interpretation of Telemetry: Sinus Rhythm 70

## 2019-06-05 NOTE — DISCHARGE NOTE PROVIDER - CARE PROVIDER_API CALL
Arun Howard)  Cardiac Electrophysiology; Cardiology; Cardiovascular Disease; Internal Medicine  [address] [place] [address]  Phone: [phone]  Fax: [phone]  Follow Up Time:

## 2021-03-23 ENCOUNTER — TRANSCRIPTION ENCOUNTER (OUTPATIENT)
Age: 66
End: 2021-03-23

## 2022-12-21 NOTE — PROGRESS NOTE ADULT - ATTENDING COMMENTS
@2200 pt complaining of SOB, Ordered PRN Breathing tx, done @2320    @2345 pt still complaining of SOB, pursed lip breathing O2 94% on 2L NC    @2350 notified JENNIFER Reveles of Pt status. STAT VBG and CXR ordered.    Patient is seen and examined with fellow, NP and the CCU house-staff. I agree with the history, physical and the assessment and plan.  s/p Afib ablation in NSR and loop implant  on oral AC  continue with current medical therapy

## 2023-07-19 NOTE — PATIENT PROFILE ADULT - [id number]_[id number]_NUR
Quality 226: Preventive Care And Screening: Tobacco Use: Screening And Cessation Intervention: Patient screened for tobacco use and is an ex/non-smoker
Quality 111:Pneumonia Vaccination Status For Older Adults: Patient received any pneumococcal conjugate or polysaccharide vaccine on or after their 60th birthday and before the end of the measurement period
Quality 431: Preventive Care And Screening: Unhealthy Alcohol Use - Screening: Patient not identified as an unhealthy alcohol user when screened for unhealthy alcohol use using a systematic screening method
Detail Level: Detailed
Quality 110: Preventive Care And Screening: Influenza Immunization: Influenza Immunization Administered during Influenza season
never intubated